# Patient Record
Sex: FEMALE | Race: BLACK OR AFRICAN AMERICAN | Employment: FULL TIME | URBAN - METROPOLITAN AREA
[De-identification: names, ages, dates, MRNs, and addresses within clinical notes are randomized per-mention and may not be internally consistent; named-entity substitution may affect disease eponyms.]

---

## 2017-11-24 ENCOUNTER — OP HISTORICAL/CONVERTED ENCOUNTER (OUTPATIENT)
Dept: OTHER | Age: 37
End: 2017-11-24

## 2018-12-21 LAB
ANTIBODY SCREEN, EXTERNAL: NEGATIVE
HBSAG, EXTERNAL: NEGATIVE
HIV, EXTERNAL: NEGATIVE
RPR, EXTERNAL: NEGATIVE
RUBELLA, EXTERNAL: NORMAL
TYPE, ABO & RH, EXTERNAL: NORMAL

## 2019-06-19 LAB — GRBS, EXTERNAL: POSITIVE

## 2019-06-27 ENCOUNTER — ANESTHESIA (OUTPATIENT)
Dept: LABOR AND DELIVERY | Age: 39
End: 2019-06-27
Payer: COMMERCIAL

## 2019-06-27 ENCOUNTER — HOSPITAL ENCOUNTER (INPATIENT)
Age: 39
LOS: 2 days | Discharge: HOME | End: 2019-06-29
Attending: OBSTETRICS & GYNECOLOGY | Admitting: OBSTETRICS & GYNECOLOGY
Payer: COMMERCIAL

## 2019-06-27 ENCOUNTER — ANESTHESIA EVENT (OUTPATIENT)
Dept: LABOR AND DELIVERY | Age: 39
End: 2019-06-27
Payer: COMMERCIAL

## 2019-06-27 PROBLEM — Z34.90 PREGNANCY: Status: ACTIVE | Noted: 2019-06-27

## 2019-06-27 LAB
ABO + RH BLD: NORMAL
BLOOD GROUP ANTIBODIES SERPL: NORMAL
ERYTHROCYTE [DISTWIDTH] IN BLOOD BY AUTOMATED COUNT: 13.8 % (ref 11.5–14.5)
HCT VFR BLD AUTO: 38.4 % (ref 35–47)
HGB BLD-MCNC: 13 G/DL (ref 11.5–16)
MCH RBC QN AUTO: 30.2 PG (ref 26–34)
MCHC RBC AUTO-ENTMCNC: 33.9 G/DL (ref 30–36.5)
MCV RBC AUTO: 89.3 FL (ref 80–99)
NRBC # BLD: 0 K/UL (ref 0–0.01)
NRBC BLD-RTO: 0 PER 100 WBC
PLATELET # BLD AUTO: 212 K/UL (ref 150–400)
PMV BLD AUTO: 11.2 FL (ref 8.9–12.9)
RBC # BLD AUTO: 4.3 M/UL (ref 3.8–5.2)
SPECIMEN EXP DATE BLD: NORMAL
TYPE, ABO & RH, EXTERNAL: NORMAL
WBC # BLD AUTO: 6.4 K/UL (ref 3.6–11)

## 2019-06-27 PROCEDURE — 77030036554

## 2019-06-27 PROCEDURE — 76210000084 HC EXTENDED RECOVERY PER HOUR

## 2019-06-27 PROCEDURE — 77030032490 HC SLV COMPR SCD KNE COVD -B

## 2019-06-27 PROCEDURE — 85027 COMPLETE CBC AUTOMATED: CPT

## 2019-06-27 PROCEDURE — 76010000392 HC C SECN EA ADDL 0.5 HR: Performed by: OBSTETRICS & GYNECOLOGY

## 2019-06-27 PROCEDURE — 74011250636 HC RX REV CODE- 250/636: Performed by: OBSTETRICS & GYNECOLOGY

## 2019-06-27 PROCEDURE — 74011250636 HC RX REV CODE- 250/636

## 2019-06-27 PROCEDURE — 74011000258 HC RX REV CODE- 258

## 2019-06-27 PROCEDURE — 74011000250 HC RX REV CODE- 250

## 2019-06-27 PROCEDURE — 77010026065 HC OXYGEN MINIMUM MEDICAL AIR: Performed by: OBSTETRICS & GYNECOLOGY

## 2019-06-27 PROCEDURE — 76010000391 HC C SECN FIRST 1 HR: Performed by: OBSTETRICS & GYNECOLOGY

## 2019-06-27 PROCEDURE — 74011250636 HC RX REV CODE- 250/636: Performed by: ANESTHESIOLOGY

## 2019-06-27 PROCEDURE — 86900 BLOOD TYPING SEROLOGIC ABO: CPT

## 2019-06-27 PROCEDURE — 76060000078 HC EPIDURAL ANESTHESIA: Performed by: OBSTETRICS & GYNECOLOGY

## 2019-06-27 PROCEDURE — 36415 COLL VENOUS BLD VENIPUNCTURE: CPT

## 2019-06-27 PROCEDURE — 77030034850

## 2019-06-27 PROCEDURE — 74011250637 HC RX REV CODE- 250/637: Performed by: OBSTETRICS & GYNECOLOGY

## 2019-06-27 PROCEDURE — 77030007866 HC KT SPN ANES BBMI -B: Performed by: ANESTHESIOLOGY

## 2019-06-27 PROCEDURE — 65270000029 HC RM PRIVATE

## 2019-06-27 RX ORDER — TRANEXAMIC ACID 100 MG/ML
1 INJECTION, SOLUTION INTRAVENOUS ONCE
Status: ACTIVE | OUTPATIENT
Start: 2019-06-27 | End: 2019-06-27

## 2019-06-27 RX ORDER — KETOROLAC TROMETHAMINE 30 MG/ML
30 INJECTION, SOLUTION INTRAMUSCULAR; INTRAVENOUS
Status: DISPENSED | OUTPATIENT
Start: 2019-06-27 | End: 2019-06-28

## 2019-06-27 RX ORDER — BUPIVACAINE HYDROCHLORIDE 7.5 MG/ML
INJECTION, SOLUTION EPIDURAL; RETROBULBAR AS NEEDED
Status: DISCONTINUED | OUTPATIENT
Start: 2019-06-27 | End: 2019-06-27 | Stop reason: HOSPADM

## 2019-06-27 RX ORDER — SODIUM CHLORIDE, SODIUM LACTATE, POTASSIUM CHLORIDE, CALCIUM CHLORIDE 600; 310; 30; 20 MG/100ML; MG/100ML; MG/100ML; MG/100ML
1000 INJECTION, SOLUTION INTRAVENOUS CONTINUOUS
Status: DISCONTINUED | OUTPATIENT
Start: 2019-06-27 | End: 2019-06-28 | Stop reason: HOSPADM

## 2019-06-27 RX ORDER — OXYCODONE HYDROCHLORIDE 5 MG/1
5 TABLET ORAL
Status: ACTIVE | OUTPATIENT
Start: 2019-06-27 | End: 2019-06-28

## 2019-06-27 RX ORDER — MORPHINE SULFATE 0.5 MG/ML
INJECTION, SOLUTION EPIDURAL; INTRATHECAL; INTRAVENOUS AS NEEDED
Status: DISCONTINUED | OUTPATIENT
Start: 2019-06-27 | End: 2019-06-27 | Stop reason: HOSPADM

## 2019-06-27 RX ORDER — OXYTOCIN/RINGER'S LACTATE 20/1000 ML
125-500 PLASTIC BAG, INJECTION (ML) INTRAVENOUS ONCE
Status: ACTIVE | OUTPATIENT
Start: 2019-06-27 | End: 2019-06-27

## 2019-06-27 RX ORDER — DIPHENHYDRAMINE HYDROCHLORIDE 50 MG/ML
12.5 INJECTION, SOLUTION INTRAMUSCULAR; INTRAVENOUS
Status: ACTIVE | OUTPATIENT
Start: 2019-06-27 | End: 2019-06-28

## 2019-06-27 RX ORDER — FENTANYL CITRATE 50 UG/ML
INJECTION, SOLUTION INTRAMUSCULAR; INTRAVENOUS AS NEEDED
Status: DISCONTINUED | OUTPATIENT
Start: 2019-06-27 | End: 2019-06-27 | Stop reason: HOSPADM

## 2019-06-27 RX ORDER — IBUPROFEN 800 MG/1
800 TABLET ORAL EVERY 8 HOURS
Status: DISCONTINUED | OUTPATIENT
Start: 2019-06-27 | End: 2019-06-29 | Stop reason: HOSPADM

## 2019-06-27 RX ORDER — NALOXONE HYDROCHLORIDE 0.4 MG/ML
0.4 INJECTION, SOLUTION INTRAMUSCULAR; INTRAVENOUS; SUBCUTANEOUS AS NEEDED
Status: DISCONTINUED | OUTPATIENT
Start: 2019-06-27 | End: 2019-06-29 | Stop reason: HOSPADM

## 2019-06-27 RX ORDER — ACETAMINOPHEN 325 MG/1
650 TABLET ORAL
Status: DISCONTINUED | OUTPATIENT
Start: 2019-06-27 | End: 2019-06-29 | Stop reason: HOSPADM

## 2019-06-27 RX ORDER — SODIUM CHLORIDE 0.9 % (FLUSH) 0.9 %
5-40 SYRINGE (ML) INJECTION EVERY 8 HOURS
Status: DISCONTINUED | OUTPATIENT
Start: 2019-06-27 | End: 2019-06-28 | Stop reason: HOSPADM

## 2019-06-27 RX ORDER — HYDROCODONE BITARTRATE AND ACETAMINOPHEN 5; 325 MG/1; MG/1
1 TABLET ORAL
Status: DISCONTINUED | OUTPATIENT
Start: 2019-06-27 | End: 2019-06-29 | Stop reason: HOSPADM

## 2019-06-27 RX ORDER — SODIUM CHLORIDE, SODIUM LACTATE, POTASSIUM CHLORIDE, CALCIUM CHLORIDE 600; 310; 30; 20 MG/100ML; MG/100ML; MG/100ML; MG/100ML
125 INJECTION, SOLUTION INTRAVENOUS CONTINUOUS
Status: DISPENSED | OUTPATIENT
Start: 2019-06-27 | End: 2019-06-28

## 2019-06-27 RX ORDER — DIPHENHYDRAMINE HCL 25 MG
25 CAPSULE ORAL
Status: DISCONTINUED | OUTPATIENT
Start: 2019-06-27 | End: 2019-06-29 | Stop reason: HOSPADM

## 2019-06-27 RX ORDER — OXYCODONE HYDROCHLORIDE 5 MG/1
10 TABLET ORAL
Status: ACTIVE | OUTPATIENT
Start: 2019-06-27 | End: 2019-06-28

## 2019-06-27 RX ORDER — SODIUM CHLORIDE 0.9 % (FLUSH) 0.9 %
5-40 SYRINGE (ML) INJECTION AS NEEDED
Status: DISCONTINUED | OUTPATIENT
Start: 2019-06-27 | End: 2019-06-29 | Stop reason: HOSPADM

## 2019-06-27 RX ORDER — CEFAZOLIN SODIUM/WATER 2 G/20 ML
2 SYRINGE (ML) INTRAVENOUS ONCE
Status: COMPLETED | OUTPATIENT
Start: 2019-06-27 | End: 2019-06-27

## 2019-06-27 RX ORDER — DOCUSATE SODIUM 100 MG/1
100 CAPSULE, LIQUID FILLED ORAL 2 TIMES DAILY
Status: DISCONTINUED | OUTPATIENT
Start: 2019-06-27 | End: 2019-06-29 | Stop reason: HOSPADM

## 2019-06-27 RX ORDER — SODIUM CHLORIDE 0.9 % (FLUSH) 0.9 %
5-40 SYRINGE (ML) INJECTION AS NEEDED
Status: DISCONTINUED | OUTPATIENT
Start: 2019-06-27 | End: 2019-06-28 | Stop reason: HOSPADM

## 2019-06-27 RX ORDER — SODIUM CHLORIDE 0.9 % (FLUSH) 0.9 %
5-40 SYRINGE (ML) INJECTION EVERY 8 HOURS
Status: DISCONTINUED | OUTPATIENT
Start: 2019-06-27 | End: 2019-06-29 | Stop reason: HOSPADM

## 2019-06-27 RX ORDER — SIMETHICONE 80 MG
80 TABLET,CHEWABLE ORAL AS NEEDED
Status: DISCONTINUED | OUTPATIENT
Start: 2019-06-27 | End: 2019-06-29 | Stop reason: HOSPADM

## 2019-06-27 RX ORDER — SODIUM CHLORIDE, SODIUM LACTATE, POTASSIUM CHLORIDE, CALCIUM CHLORIDE 600; 310; 30; 20 MG/100ML; MG/100ML; MG/100ML; MG/100ML
INJECTION, SOLUTION INTRAVENOUS
Status: DISCONTINUED | OUTPATIENT
Start: 2019-06-27 | End: 2019-06-27 | Stop reason: HOSPADM

## 2019-06-27 RX ORDER — CEFAZOLIN SODIUM/WATER 2 G/20 ML
SYRINGE (ML) INTRAVENOUS
Status: DISCONTINUED
Start: 2019-06-27 | End: 2019-06-27 | Stop reason: WASHOUT

## 2019-06-27 RX ORDER — HYDROMORPHONE HYDROCHLORIDE 2 MG/ML
0.5 INJECTION, SOLUTION INTRAMUSCULAR; INTRAVENOUS; SUBCUTANEOUS
Status: ACTIVE | OUTPATIENT
Start: 2019-06-27 | End: 2019-06-28

## 2019-06-27 RX ORDER — OXYTOCIN 10 [USP'U]/ML
INJECTION, SOLUTION INTRAMUSCULAR; INTRAVENOUS AS NEEDED
Status: DISCONTINUED | OUTPATIENT
Start: 2019-06-27 | End: 2019-06-27 | Stop reason: HOSPADM

## 2019-06-27 RX ORDER — ZOLPIDEM TARTRATE 5 MG/1
5 TABLET ORAL
Status: DISCONTINUED | OUTPATIENT
Start: 2019-06-28 | End: 2019-06-29 | Stop reason: HOSPADM

## 2019-06-27 RX ADMIN — FENTANYL CITRATE 25 MCG: 50 INJECTION, SOLUTION INTRAMUSCULAR; INTRAVENOUS at 08:20

## 2019-06-27 RX ADMIN — KETOROLAC TROMETHAMINE 30 MG: 30 INJECTION, SOLUTION INTRAMUSCULAR at 21:53

## 2019-06-27 RX ADMIN — SODIUM CHLORIDE, SODIUM LACTATE, POTASSIUM CHLORIDE, AND CALCIUM CHLORIDE 1000 ML: 600; 310; 30; 20 INJECTION, SOLUTION INTRAVENOUS at 05:50

## 2019-06-27 RX ADMIN — OXYTOCIN 30 UNITS: 10 INJECTION, SOLUTION INTRAMUSCULAR; INTRAVENOUS at 08:19

## 2019-06-27 RX ADMIN — Medication 2 G: at 07:40

## 2019-06-27 RX ADMIN — FENTANYL CITRATE 40 MCG: 50 INJECTION, SOLUTION INTRAMUSCULAR; INTRAVENOUS at 08:35

## 2019-06-27 RX ADMIN — BUPIVACAINE HYDROCHLORIDE 1.8 ML: 7.5 INJECTION, SOLUTION EPIDURAL; RETROBULBAR at 08:00

## 2019-06-27 RX ADMIN — SODIUM CHLORIDE, SODIUM LACTATE, POTASSIUM CHLORIDE, CALCIUM CHLORIDE: 600; 310; 30; 20 INJECTION, SOLUTION INTRAVENOUS at 07:47

## 2019-06-27 RX ADMIN — DOCUSATE SODIUM 100 MG: 100 CAPSULE, LIQUID FILLED ORAL at 18:28

## 2019-06-27 RX ADMIN — SODIUM CHLORIDE, SODIUM LACTATE, POTASSIUM CHLORIDE, AND CALCIUM CHLORIDE 1000 ML: 600; 310; 30; 20 INJECTION, SOLUTION INTRAVENOUS at 06:44

## 2019-06-27 RX ADMIN — FENTANYL CITRATE 10 MCG: 50 INJECTION, SOLUTION INTRAMUSCULAR; INTRAVENOUS at 08:00

## 2019-06-27 RX ADMIN — FENTANYL CITRATE 25 MCG: 50 INJECTION, SOLUTION INTRAMUSCULAR; INTRAVENOUS at 08:28

## 2019-06-27 RX ADMIN — MORPHINE SULFATE 200 MCG: 0.5 INJECTION, SOLUTION EPIDURAL; INTRATHECAL; INTRAVENOUS at 08:00

## 2019-06-27 RX ADMIN — KETOROLAC TROMETHAMINE 30 MG: 30 INJECTION, SOLUTION INTRAMUSCULAR at 13:54

## 2019-06-27 NOTE — PROGRESS NOTES
07-Bedside report received from Jay Ochoa RN. POC discussed with pt at this time, pt verbalized understanding and has no questions or concerns at this time. 07-Leroy Pickering at bedside to see pt and discuss POC.  07-Dr. Cammie Yoder at bedside to discuss POC and  section with pt at this time. 0743-Pt to OR in stable condition. 08-Dr. Cammie Yoder reported that pt's blood type from blood bank and blood type form record are different. Pt states at this time she is O+ which is blood type confirmed by lab. MD states must be a transcription issue and to call and get [facility] records sent from office after c/s. Lab called and this time to verify two pink tops were sent today for blood type confirmation and lab reports yes. 0850-QBL at delivery 700  1050-QBL 10 2hrs postpartum  0856-Infant placed with mother at this time wrapped in blankets due to mothers low temperature. 0905-Writer called lab for second time to confirm two pink tops were sent to verify blood type, lab states yes and that blood type is O+. 0911-Office called to request [facility] records, office states they will fax at this time. 0918-Infant to nursery at this time to be evaluated further. 0923-Fax received from office, [facility] records show A+. Dr. Cammie Yoder called, message left. 1048-Dr. Cammie Yoder called for update, MD reported that after receiving [facility] records it too states A+ which is incorrect. MD also reported that writer revalidated with hospital lab that pt's blood type is O+ which was confirmed with two pink top samples. No new orders at this time. 162-Writer placed abdominal binder on pt at this time. 162-Pt to NICU to see infant via wheelchair. -Pt assisted back to room via wheelchair. -Pt given menu to order dinner. -Silva catheter removed at this time, 535cc of urine noted when discontinued. -Bedside report given to SUSI Simon RN.

## 2019-06-27 NOTE — L&D DELIVERY NOTE
Operative Note    Name: Rainer Barber   Medical Record Number: [id number]   YOB: 1980  Today's Date: 2019      Pre-operative Diagnosis: IUP 36wks 6 days, prior extensive myomectomy, AMA    Post-operative Diagnosis: same, delivered    Procedure: primary LTCS    Surgeon(s):  Sarah Martini MD    Assist:  Austin Ramirez    Anesthesia: Spinal     QBL 700cc    Prophylactic Antibiotics: Ancef  DVT Prophylaxis: Sequential Compression Devices         Fetal Description: rudolph     Birth Information:   Information for the patient's :  Deidra Shone, Male Mikle Duverney [[id number]]   Delivery of a 2.39 kg male infant on 2019 at 8:17 AM. Apgars were 9  and 9 . Umbilical Cord: 3 Vessels     Umbilical Cord Events: None     Placenta: Spontaneous removal with Normal appearance. Amniotic Fluid Volume: Moderate     Amniotic Fluid Description:  Clear        Umbilical Cord: 3 vessels present    Placenta:  manual removal    Additional intraoperative findings: viable male vertex, apgars 9 and 9. 5lb 4 oz. Enlarged fibroid uterus.  Adnexa not evaluated    Specimens: none           Complications: none    Implants: none    Stable to PACU    Nalini Tamayo MD  2019  9:01 AM

## 2019-06-27 NOTE — DISCHARGE INSTRUCTIONS
Discharge Instructions for  Section    Patient ID:  Luzmaria Andrews  [id number]  37 y.o.  1980    Take Home Medications       Continue taking your prenatal vitamins if you are breastfeeding. Follow-up care is a key part of your treatment and safety. Be sure to keep all your scheduled appointments    Activity  1. Avoid heavy lifting greater than 10lbs for 2 weeks after your surgery  2. Pelvic rest for 6 weeks, ie, nothing in your vagina for 6 weeks  (no intercourse, tampons, or douching). No tubs for 6 weeks. 3. No driving for 2 weeks and until you are no longer taking prescription pain medications and you can put your foot on the break in a hurry   4. Limit climbing stairs to only when necessary the first 1-2 weeks. Hold the railing and do not carry your baby up and downstairs at first for safety   5. You may walk as tolerated, though be care to not over-do it. Walking will assist in overall healing, decrease constipation and bloating. Euell Reddish feel better more quickly with some daily movement. Diet  Regular diet as tolerated    Wound care  There are several types of incision closures. 1. If you have metal staples in place when you leave the hospital, please call your doctors office to schedule the staple removal as directed at discharge. 2. If you have steri strips covering your incision, you may remove them as they fall off or be sure to remove them about one week after your surgery. Removing them in the shower may be easier. 3. If you have Dermabond, or skin glue, covering your incision, it will fall off slowly in the next few weeks. You may remove it as it begins to peel off. Additional wound care  1. Clear or reddish drainage from your incision is normal.  It's best to leave it open to the air, but if there is drainage, you may cover the incision lightly with gauze, preferably without tape. 2.  Keep the incision clean and dry.     3. Numbness of the skin at or around your incision is normal and the feeling usually returns gradually. 4. Call your doctor if you have increasing drainage from your incision, an unpleasant odor, red streaks, an increase in pain, or if it appears to open. Pain Management  1. Continue prescription pain medication as written by discharging physician  2. Over the counter medications such as Tylenol and ibuprofen (Motrin or Advil) are also ideal.  These may be taken together or in alternating doses. You may  take the maximum dose:  Motrin or Advil (generic ibuprofen), either 3 tablets every 6 hours or 4 tablets every 8 hours. Your prescription medication conntains a narcotic mixed with Tylenol,so  you should not take any extra Tylenol or acetaminophen until you have reduced your prescription pain medication. The maximum dose is Tylenol or acetominophen 1000 mg every 6 hours (equivalent to 2 Extra Strength Tylenols every 6 hours). 3. After a few days, begin to replace the prescription medication with over the counter medications. Use the prescription medication if needed for more severe pain or at night. The prescription medication can be addictive if overused. 4. Add heating pad or sitz baths as needed. Constipation  1. Constipation is normal after surgery, especially while taking prescription narcotic pain medication. 2. Over the counter remedies including ducosate (Colace), take 1-2 capsules 1-2 times daily for soft stool as needed. You may also add/ try milk of magnesia or rectal remedies such as Dulcolax or Fleets enema. Recovery: What to Expect at Home  1. Fatigue is expected. Try to rest when you can and don't worry about doing housework or other tasks which can wait. 2. The soreness in your incision will improve significantly over the first 2 weeks, but it may take 6-8 weeks before you are completely recovered.   3. Back pain or general body aches or muscle soreness are expected and should improve with acetominophen or ibuprofen. 4. Leg swelling due to pregnancy and/or IV fluids given in the hospital will take about two weeks to resolve. 5. Most women experience some form of the \"Baby Blues\" after having a baby. Feeling emotional, tearful, frustrated, anxious, sad, and irritable some of the time is normal and go away after about 2 weeks. Adequate rest and help from your family will help. Take breaks from caring for the baby. Call your doctor if your symptoms seem severe, last more than 2 weeks, or seem to be getting worse instead of better. Get help immediately if you have thoughts of wanting to hurt yourself or others! Call your doctor or seek immediate medical care if you have:  Heavy vaginal bleeding, soaking through one or more pads an hour for several hours. Foul-smelling discharge from your vagina or incision. Consistent nausea and vomiting and cannot keep fluids down. Consistent pain that does not get better after you take pain medicine.   Sudden chest pain and shortness of breath  Signs of a blood clot: pain/ swelling/ increasing redness in your lower extremeties  Signs of infection: increased pain in your abdomen or vaginal area; red streaks, warmth, or tenderness of your breasts; fever of 100.5 F or greater

## 2019-06-27 NOTE — ANESTHESIA PROCEDURE NOTES
Spinal Block    Start time: 6/27/2019 7:52 AM  End time: 6/27/2019 8:00 AM  Performed by: Karrie Wood MD  Authorized by: Karrie Wood MD     Pre-procedure: Indications: primary anesthetic  Preanesthetic Checklist: patient identified, risks and benefits discussed, anesthesia consent, patient being monitored and timeout performed    Timeout Time: 07:52          Spinal Block:   Patient Position:  Seated  Prep Region:  Lumbar  Prep: chlorhexidine      Location:  L3-4  Technique:  Single shot    Local Dose (mL):  5    Needle:   Needle Type:   Aishwarya    Attempts:  2      Events: CSF confirmed, no blood with aspiration, no paresthesia and injection painful        Assessment:  Insertion:  Uncomplicated

## 2019-06-27 NOTE — ANESTHESIA POSTPROCEDURE EVALUATION
Procedure(s):   SECTION. spinal    Anesthesia Post Evaluation      Multimodal analgesia: multimodal analgesia not used between 6 hours prior to anesthesia start to PACU discharge  Patient location during evaluation: PACU  Patient participation: complete - patient participated  Level of consciousness: awake  Pain management: adequate  Airway patency: patent  Anesthetic complications: no  Cardiovascular status: acceptable, blood pressure returned to baseline and hemodynamically stable  Respiratory status: acceptable  Hydration status: acceptable  Post anesthesia nausea and vomiting:  controlled    /74  Temp 97.5F  O2 Sat 99%  RR 20  No vitals data found for the desired time range.

## 2019-06-27 NOTE — H&P
History & Physical    Name: Eduardo Pandey MRN: [id number]  SSN: [id number]    YOB: 1980  Age: 37 y.o. Sex: female      Subjective:     Estimated Date of Delivery: 19  OB History        5    Para        Term                AB   4    Living           SAB   4    TAB        Ectopic        Molar        Multiple        Live Births   0                Ms. Womack Must admitted with pregnancy at 36w6d for  section due to prior extensive [address] myomectomy in 2018. Prenatal course was complicated by AMA, HSV on valtrex and prior myomectomy. Please see prenatal records for details. Past Medical History:   Diagnosis Date    Fibroids     Genital herpes     History of in vitro fertilization     Infertility, female     Recurrent pregnancy loss      Past Surgical History:   Procedure Laterality Date    HX MYOMECTOMY       Social History     Occupational History    Not on file   Tobacco Use    Smoking status: Never Smoker    Smokeless tobacco: Never Used   Substance and Sexual Activity    Alcohol use: Not Currently    Drug use: Never    Sexual activity: Not on file     History reviewed. No pertinent family history. No Known Allergies  Prior to Admission medications    Medication Sig Start Date End Date Taking? Authorizing Provider   PNV66-Iron Fumarate-FA-DSS-DHA 26-1.2- mg cap Take  by mouth. Yes Provider, Historical        Review of Systems: Pertinent items are noted in the History of Present Illness.     Objective:     Vitals:  Vitals:    19 0556 19 0558 19 0713   BP: 106/67  115/70   Pulse: 81  80   Resp: 16  16   Temp: 98.5 °F (36.9 °C)  98.4 °F (36.9 °C)   Weight:  79.8 kg (176 lb)    Height:  5' 4\" (1.626 m)         Physical Exam:  General: comfortable NAD  CVS: RRR no r/m/g  Pulm: CTAB  Abdm: gravid, NT  Back: spine NT, NELLY CVA NT  LE NT w/o significant edema  [place]: none  Membranes:  Intact  Fetal Heart Rate: Reactive  Baseline: 120 130  per minute  Variability: moderate  Accelerations: yes  Decelerations: none    Prenatal Labs:   Lab Results   Component Value Date/Time    Rubella, External Immune 12/21/2018    GrBStrep, External Positive 06/19/2019    HBsAg, External Negative 12/21/2018    HIV, External Negative 12/21/2018    RPR, External Negative 12/21/2018        Impression/Plan:     Plan:  IUP @36wks 6 days prior extensive myomectomy  1. Planned c/s today  2. PARQ held yesterday in the office and reviewed again today, risks/benefits reviewed, including, but not limited to, bleeding, infection, damage to internal organs, thrombosis. Indication/ expectations/ logistics reviewed, all ques answered. Consent obtained, questions answered, proceed to OR  3. SCDs and IV abx in OR  4.  She is not anemic    Signed By:  Alva Moon MD     June 27, 2019

## 2019-06-27 NOTE — ANESTHESIA PREPROCEDURE EVALUATION
Relevant Problems   No relevant active problems       Anesthetic History   No history of anesthetic complications            Review of Systems / Medical History  Patient summary reviewed and nursing notes reviewed    Pulmonary  Within defined limits                 Neuro/Psych   Within defined limits           Cardiovascular                  Exercise tolerance: >4 METS     GI/Hepatic/Renal  Within defined limits              Endo/Other  Within defined limits           Other Findings   Comments:   EDC = 2019  C/S for previous myomectomy           Physical Exam    Airway  Mallampati: III    Neck ROM: normal range of motion   Mouth opening: Normal     Cardiovascular    Rhythm: regular  Rate: normal         Dental  No notable dental hx       Pulmonary  Breath sounds clear to auscultation               Abdominal         Other Findings            Anesthetic Plan    ASA: 2  Anesthesia type: spinal            Anesthetic plan and risks discussed with: Patient and Spouse      Informed consent obtained.

## 2019-06-27 NOTE — PROGRESS NOTES
06/27/19 12:24 PM  CM met with JUAN RAMON to complete initial assessment and to begin discharge planning. Demographics were reviewed and confirmed. JUAN RAMON and her /FOB aDrlene Mcqueen ([phone]) live together in [place] and this is their first baby. 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 MOB noted that FOB is self employed and will have a flexible schedule. MOB noted that she has a supportive group of friends who will be her help postpartum. Patient has car seat, crib, clothing, and other necessary supplies. Denied need for [facility] and Medicaid services. JUAN RAMON plans to breast and bottlefeed and confirmed that she has a Medela pump to use at home. Dr. Nick Stuart at [address] [place] [address] will provide medical follow up for the baby. Care Management Interventions  PCP Verified by CM:  Yes  Mode of Transport at Discharge: [address] (CM Consult): Discharge Planning  Current Support Network: Lives with Spouse  Confirm Follow Up Transport: Family  Plan discussed with Pt/Family/Caregiver: Yes  Freedom of Choice Offered: Yes  Discharge Location  Discharge Placement: Home with outpatient services  DIMITRY Mascorro

## 2019-06-28 LAB
ERYTHROCYTE [DISTWIDTH] IN BLOOD BY AUTOMATED COUNT: 13.8 % (ref 11.5–14.5)
HCT VFR BLD AUTO: 36.2 % (ref 35–47)
HGB BLD-MCNC: 12.1 G/DL (ref 11.5–16)
MCH RBC QN AUTO: 29.7 PG (ref 26–34)
MCHC RBC AUTO-ENTMCNC: 33.4 G/DL (ref 30–36.5)
MCV RBC AUTO: 88.7 FL (ref 80–99)
NRBC # BLD: 0 K/UL (ref 0–0.01)
NRBC BLD-RTO: 0 PER 100 WBC
PLATELET # BLD AUTO: 193 K/UL (ref 150–400)
PMV BLD AUTO: 11.1 FL (ref 8.9–12.9)
RBC # BLD AUTO: 4.08 M/UL (ref 3.8–5.2)
WBC # BLD AUTO: 8.8 K/UL (ref 3.6–11)

## 2019-06-28 PROCEDURE — 65270000029 HC RM PRIVATE

## 2019-06-28 PROCEDURE — 74011250637 HC RX REV CODE- 250/637: Performed by: OBSTETRICS & GYNECOLOGY

## 2019-06-28 PROCEDURE — 36415 COLL VENOUS BLD VENIPUNCTURE: CPT

## 2019-06-28 PROCEDURE — 85027 COMPLETE CBC AUTOMATED: CPT

## 2019-06-28 PROCEDURE — 74011250636 HC RX REV CODE- 250/636: Performed by: ANESTHESIOLOGY

## 2019-06-28 RX ADMIN — IBUPROFEN 800 MG: 800 TABLET ORAL at 23:21

## 2019-06-28 RX ADMIN — SIMETHICONE CHEW TAB 80 MG 80 MG: 80 TABLET ORAL at 23:26

## 2019-06-28 RX ADMIN — IBUPROFEN 800 MG: 800 TABLET ORAL at 15:34

## 2019-06-28 RX ADMIN — KETOROLAC TROMETHAMINE 30 MG: 30 INJECTION, SOLUTION INTRAMUSCULAR at 08:57

## 2019-06-28 RX ADMIN — DOCUSATE SODIUM 100 MG: 100 CAPSULE, LIQUID FILLED ORAL at 09:56

## 2019-06-28 NOTE — PROGRESS NOTES
2130- Patient resting in bed. Returned from NICU. Breastpump set up and instructions given. 2310- Patient declines hourly rounding at this time.  No complaints

## 2019-06-28 NOTE — LACTATION NOTE
This note was copied from a baby's chart. Mom has been pumping whilw baby had been in NICU and states has no milk yet and is giving formula now. Discussed putting baby to breast if she desires at next feeding. Mo worried she doesn't have any milk yet. Discussed supply and demand, and infants stomach size. Reviewed breastfeeding basics:  Supply and demand,  stomach size, early  Feeding cues, skin to skin, positioning and baby led latch-on, assymetrical latch with signs of good, deep latch vs shallow, feeding frequency and duration, and log sheet for tracking infant feedings and output. Breastfeeding Booklet and Warm line information given. Discussed typical  weight loss and the importance of infant weight checks with pediatrician 1-2 post discharge. Hand Expression Education:  Mom taught how to manually hand express her colostrum. Emphasized the importance of providing infant with valuable colostrum as infant rests skin to skin at breast.  Aware to avoid extended periods of non-feeding. Aware to offer 10-20+ drops of colostrum every 2-3 hours until infant is latching and nursing effectively. Taught the rationale behind this low tech but highly effective evidence based practice. Pt will successfully establish breastfeeding by feeding in response to early feeding cues   or wake every 3h, will obtain deep latch, and will keep log of feedings/output. Taught to BF at hunger cues and or q 2-3 hrs and to offer 10-20 drops of hand expressed colostrum at any non-feeds. Breast Assessment  Left Breast: Medium, Large  Left Nipple: Everted, Intact  Right Breast: Medium  Right Nipple: Intact, Everted  Breast- Feeding Assessment  Attends Breast-Feeding Classes: No  Breast Trauma/Surgery: No  Lactation Consultant Visits  Breast-Feedings: Not breast-feeding(mom giving formula and plsna to breast feed.)     Discussed with mother her plan for feeding.   Reviewed the benefits of exclusive breast milk feeding during the hospital stay. Informed her of the risks of using formula to supplement in the first few days of life as well as the benefits of successful breast milk feeding; referred her to the Breastfeeding booklet about this information. She acknowledges understanding of information reviewed and states that it is her plan to both breast and formula feed her infant. Will support her choice and offer additional information as needed.

## 2019-06-28 NOTE — PROGRESS NOTES
0745-Bedside report received from SUSI Smith RN. Pt sleeping, did not arouse during report, Austin Begin will let pt sleep and come back once pt is up to perform assessment/ VS.  1204-TRANSFER - OUT REPORT:    Verbal report given to BRIGIDA Mahoney RN(name) on [employer]  being transferred to MIU(unit) for routine progression of care       Report consisted of patients Situation, Background, Assessment and   Recommendations(SBAR). Information from the following report(s) SBAR, Kardex, OR Summary, Procedure Summary, Intake/Output, MAR, Recent Results and Med Rec Status was reviewed with the receiving nurse. Lines:       Opportunity for questions and clarification was provided.       Patient transported with:   Registered Nurse

## 2019-06-28 NOTE — PROGRESS NOTES
PostPartum Note    Stacie Howe  [id number]  1980  37 y.o.    S:  Ms. Stacie Howe is a 37 y.o.  POD #1 s/p LTCS @ 36w6d. Doing well. She had a baby boy who is in the NICU. Her lochia is like a period. She describes her pain as mild and is well controlled with PO medications. Tolerating PO intake. O:   Visit Vitals  /60   Pulse 77   Temp 98.2 °F (36.8 °C)   Resp 16   Ht 5' 4\" (1.626 m)   Wt 79.8 kg (176 lb)   SpO2 99%   Breastfeeding? Unknown   BMI 30.21 kg/m²       Lab Results   Component Value Date/Time    WBC 8.8 2019 05:52 AM    HGB 12.1 2019 05:52 AM    HCT 36.2 2019 05:52 AM    PLATELET 655  05:52 AM    MCV 88.7 2019 05:52 AM       Gen - No acute distress  Abdomen - Fundus firm, below the umbilicus; incision c/d/i    Ext - Warm, well perfused. Nontender    A/P:  POD #1 s/p LTCS @ 36w6d doing well. 1.  Routine PP instructions/ care discussed  2. Blood type - Rh +  3. Rubella imm  4. Circumcision - deferred as baby in NICU    5. Discharge POD#3    6. F/U 4-6 weeks for PP check.       Zeeshan Moralez MD  [facility]

## 2019-06-28 NOTE — OP NOTES
Boaz Bhatti [facility] 77  OPERATIVE REPORT    Name:  Landon Benson  MR#:  [id number]  :  1980  ACCOUNT #:  [de-identified]  DATE OF SERVICE:  2019    PREOPERATIVE DIAGNOSES:  1. Intrauterine pregnancy at 36 weeks and 6 days. 2.  Prior extensive [place] myomectomy. 3.  Advanced maternal age. POSTOPERATIVE DIAGNOSES:  1. Intrauterine pregnancy at 36 weeks and 6 days. 2.  Prior extensive [place] myomectomy. 3.  Advanced maternal age. 4.  Delivered. PROCEDURE PERFORMED:  Primary low transverse  section. SURGEON:  Neema Mills MD    ASSISTANT:  Lin Spann. ANESTHESIA:  Spinal.    COMPLICATIONS:  None. SPECIMENS REMOVED:  None. IMPLANTS:  none    ESTIMATED BLOOD LOSS:  See QBL    QUANTITAVE BLOOD LOSS:  700 mL. IV FLUIDS:  1500 mL of crystalloids. URINE OUTPUT:  120 mL, clear. INTRAOPERATIVE FINDINGS:  A viable male infant in vertex presentation with Apgars of 9 and 9, weight 5 pounds 4 ounces. Adnexa were not evaluated. Uterus:  Enlarged fibroid uterus, repaired without being exteriorized. INDICATIONS:  The patient is a 75-year-old G5, P0-0-4-0, with an intrauterine pregnancy at 36 weeks and 6 days who presents to Labor and Delivery for  section due to her history of prior extensive myomectomy. PROCEDURE:  The patient was taken to the operating room where her spinal anesthesia was easily placed and found to be adequate. She was prepped and draped in normal sterile fashion in dorsal supine position with leftward tilt. A Pfannenstiel skin incision was made two fingerbreadths above the pubic symphysis and carried down to the underlying layer of fascia. The fascia was incised at the midline and the incision was extended bilaterally. The superior aspect of the fascial incision was grasped with Kocher clamps and elevated and the underlying rectus muscles were dissected off both bluntly and sharply.   The same was done to the inferior aspect of the fascial incision. The rectus muscle was  in the midline with a scalpel and the abdominal cavity was entered sharply with excellent visualization of underlying structures in the bowel, bladder and uterus. A bladder blade was placed. The bladder and vesicouterine peritoneum were well below the operative field. A low transverse incision was made on the uterus and clear fluid was noted at the amniotomy. A viable male infant was delivered from vertex presentation without difficulty. His cord was clamped and cut after a 30-second cord clamp delay. He was handed off to the waiting pediatric team.  The placenta was delivered with manual assistance and the uterus was cleared of clots and debris. It was unable to be exteriorized for repair. The hysterotomy was repaired with 0 Vicryl in a running locked fashion in two layers. At the completion, two additional figure-of-eight sutures were placed at the midline to ensure hemostasis. When this was completed, the hysterotomy was reinspected and noted to be hemostatic. The gutters were cleared of clots and debris. The peritoneum was then reapproximated with 2-0 Vicryl in a running fashion. The rectus muscles were reapproximated with 3-0 Vicryl in a block-stitch fashion. The inferior and superior fascial planes were inspected and noted to be hemostatic. The fascia was reapproximated with 0 Vicryl in a running fashion. The subcutaneous tissue was 1 cm thickened, hemostatic. The skin was closed with 4-0 Monocryl in a subcuticular fashion. Steri-Strips were placed. The patient tolerated the procedure well. All sponge, lap, and needle counts were correct x2 and she was taken to the recovery room in stable condition. She received Ancef 2 g IV prior to incision and TXA IV just after the incision. SCDs were placed upon entering the operating room.         Louvenia Olszewski, MD      [id number]/  D:  06/27/2019 11:03  T:  06/27/2019 21:56  JOB #:  [id number]

## 2019-06-29 VITALS
BODY MASS INDEX: 30.05 KG/M2 | TEMPERATURE: 98.1 F | SYSTOLIC BLOOD PRESSURE: 107 MMHG | DIASTOLIC BLOOD PRESSURE: 73 MMHG | WEIGHT: 176 LBS | HEART RATE: 78 BPM | OXYGEN SATURATION: 99 % | HEIGHT: 64 IN | RESPIRATION RATE: 16 BRPM

## 2019-06-29 PROCEDURE — 74011250637 HC RX REV CODE- 250/637: Performed by: OBSTETRICS & GYNECOLOGY

## 2019-06-29 RX ORDER — HYDROCODONE BITARTRATE AND ACETAMINOPHEN 5; 325 MG/1; MG/1
1 TABLET ORAL
Qty: 12 TAB | Refills: 0 | Status: SHIPPED | OUTPATIENT
Start: 2019-06-29 | End: 2019-07-04

## 2019-06-29 RX ADMIN — IBUPROFEN 800 MG: 800 TABLET ORAL at 13:15

## 2019-06-29 NOTE — LACTATION NOTE
This note was copied from a baby's chart. Went in for lactation visit with mother, mother states she has no milk so she is not putting baby to breast she is just pumping some and feeding baby formula. Mother states she would like to feed baby at the breast, discussed the importance of putting baby to breast to assist in milk coming in and the baby learning to breastfeed. Mother has pump for home use and does plan to offer formula as well as breast. Mother to call [facility] for next feeding as baby was just recently formula fed.

## 2019-06-29 NOTE — LACTATION NOTE
This note was copied from a baby's chart. Discussed with mother her plan for feeding. Reviewed the benefits of exclusive breast milk feeding during the hospital stay. Informed her of the risks of using formula to supplement in the first few days of life as well as the benefits of successful breast milk feeding; referred her to the Breastfeeding booklet about this information. She acknowledges understanding of information reviewed and states that it is her plan to both breast and formula feed her infant. Will support her choice and offer additional information as needed. Pt chooses to do both breast and bottle. Discussed effects of early supplementation on breastfeeding success; may decrease breastmilk production and supply, increase risk for pathological engorgement, baby may develop preference for faster flow from bottles vs breast, and baby's stomach can be stretched if larger volumes of formula are given. Mother has been pumping only and offering baby formula in bottle, states that baby won't latch to the breast, assisted mother with latching baby, baby very fussy at the breast won't latch, shield placed on mother, baby latched with shield with no problem. Pros and cons of nipple shield use reviewed. Patient instructed how to apply shield to nipple/areola and cleaning of nipple shield. Nipple shield plan of care includes breastfeeding with nipple shield per instructions. Reinforces with pt that nipple shield is best used as temporary tool/aid to help infant learn how to latch onto breast.  Recommend follow-up with OP lactation consultant after discharge from hospital.  Reviewed community resources for breastfeeding support. Guidelines for pumping, milk collection and storage, proper cleaning of pump parts all reviewed. Differences between hospital grade rental pumps vs store bought double electric/hand pumps discussed. Set up pumping with double electric set up. Assisted with pump session. List of area pump rental locations and lactation support services reviewed. Reviewed breastfeeding basics:  How milk is made and normal  breastfeeding behaviors discussed. Supply and demand,  stomach size, early feeding cues, skin to skin bonding with comfortable positioning and baby led latch-on reviewed. How to identify signs of successful breastfeeding sessions reviewed; education on assymetrical latch, signs of effective latching vs shallow, in-effective latching, normal  feeding frequency and duration and expected infant output discussed. Normal course of breastfeeding discussed including the AAP's recommendation that children receive exclusive breast milk feedings for the first six months of life with breast milk feedings to continue through the first year of life and/or beyond as complimentary table foods are added. Breastfeeding Booklet and Warm line information provided with discussion. Discussed typical  weight loss and the importance of pediatrician appointment within 24-48 hours of discharge, at 2 weeks of life and normalcy of requesting pediatric weight checks as needed in between visits. Pt will successfully establish breastfeeding by feeding in response to early feeding cues   or wake every 3h, will obtain deep latch, and will keep log of feedings/output. Taught to BF at hunger cues and or q 2-3 hrs.     Breast Assessment  Left Breast: Medium, Large  Left Nipple: Everted, Intact  Right Breast: Medium, Large  Right Nipple: Everted, Intact  Breast- Feeding Assessment  Attends Breast-Feeding Classes: No  Breast-Feeding Experience: No  Breast Trauma/Surgery: No  Type/Quality: Fair(with shield)  Lactation Consultant Visits  Breast-Feedings: Attempted breast-feeding  Mother/Infant Observation  Mother Observation: Breast comfortable, Close hold  Infant Observation: Latches nipple and aereolae, Lips flanged, lower, Lips flanged, upper, Opens mouth  LATCH Documentation  Latch: Grasps breast, tongue down, lips flanged, rhythmic sucking(with shield)  Audible Swallowing: A few with stimulation  Type of Nipple: Everted (after stimulation)  Comfort (Breast/Nipple): Soft/non-tender  Hold (Positioning): Full assist, teach one side, mother does other, staff holds  [facility] Score: 8

## 2019-06-29 NOTE — PROGRESS NOTES
PostPartum Note    Darrel Pettit  [id number]  1980  37 y.o.    S:  Ms. Darrel Pettit is a 37 y.o.  POD #2 s/p LTCS @ 36w6d. Doing well. She had a baby boy. Her lochia is like a period. She describes her pain as mild and is well controlled with PO medications. She is pumping and this is going well. She is ambulating and voiding. Tolerating PO intake. O:   Visit Vitals  /73 (BP 1 Location: Left arm, BP Patient Position: At rest)   Pulse 78   Temp 98.1 °F (36.7 °C)   Resp 16   Ht 5' 4\" (1.626 m)   Wt 79.8 kg (176 lb)   SpO2 99%   Breastfeeding? Unknown   BMI 30.21 kg/m²       Lab Results   Component Value Date/Time    WBC 8.8 2019 05:52 AM    HGB 12.1 2019 05:52 AM    HCT 36.2 2019 05:52 AM    PLATELET 518  05:52 AM    MCV 88.7 2019 05:52 AM       Gen - No acute distress  Abdomen - Fundus firm, below the umbilicus   Ext - Warm, well perfused. Nontender    A/P:  POD #2 s/p LTCS @ 36w6d doing well. 1.  Routine PP instructions/ care discussed  2. Blood type - Rh pos  3. Rubella imm  4. Circumcision desired and consented   5. Discharge home today   6. F/U 4-6 weeks for PP check.       Lamont Roque MD  [facility]

## 2024-06-03 NOTE — DISCHARGE SUMMARY
Patient ID:  Zaynab Malcolm  [id number]  89 y.o.  1980    Admit Date: 2019    Discharge Date:     Admitting Physician: Saud Fregoso MD    Attending Physician: Latrell Moreno MD    Admission Diagnoses: Pregnancy [Z34.90]    Procedures for this admission: Procedure(s):   SECTION    Discharge Diagnoses: Same as above with primary LTCS producing a viable infant. Information for the patient's :  Christi Carterarld Jurist [[id number]]       boy apgars 5 and 5, 5lb 4oz     Discharge Disposition:  good    Discharge Condition:  good    Additional Diagnoses: IUP 36wks 6 days, prior extensive myomectomy. AMA. Maternal Labs:   Lab Results   Component Value Date/Time    HBsAg, External Negative 2018    HIV, External Negative 2018    Rubella, External Immune 2018    RPR, External Negative 2018    GrBStrep, External Positive 2019       Cord Blood Results:   Information for the patient's :  Christi Carter Gwendolin [[id number]]   No results found for: PCTABR, PCTDIG, BILI, ABORH          History of Present Illness:   OB History        5    Para        Term                AB   4    Living           SAB   4    TAB        Ectopic        Molar        Multiple        Live Births   0              Admitted for  Section. Hospital Course:   Patient was admitted as above and delivered via primary LTCS . Please the chart for details. The postpartum course was unremarkable. She was deemed stable for discharge home on day 2.     Follow-up Care: 4-6wks        Signed:  Saud Fregoso MD  2019  8:58 AM independent

## (undated) DEVICE — STERILE POLYISOPRENE POWDER-FREE SURGICAL GLOVES: Brand: PROTEXIS

## (undated) DEVICE — 3000CC GUARDIAN II: Brand: GUARDIAN

## (undated) DEVICE — HANDLE LT SNAP ON ULT DURABLE LENS FOR TRUMPF ALC DISPOSABLE

## (undated) DEVICE — STERILE POLYISOPRENE POWDER-FREE SURGICAL GLOVES WITH EMOLLIENT COATING: Brand: PROTEXIS

## (undated) DEVICE — SUTURE VCRL SZ 0 L36IN ABSRB VLT L40MM CT 1/2 CIR J358H

## (undated) DEVICE — REM POLYHESIVE ADULT PATIENT RETURN ELECTRODE: Brand: VALLEYLAB

## (undated) DEVICE — ROCKER SWITCH PENCIL HOLSTER: Brand: VALLEYLAB

## (undated) DEVICE — SUTURE MCRYL SZ 4 0 L18IN ABSRB VLT PS 1 L24MM 3 8 CIR REV Y682H

## (undated) DEVICE — TRAY CATH OD16FR SIL URIN M STATLOK STBL DEV SURSTP

## (undated) DEVICE — LARGE, DISPOSABLE ALEXIS O C-SECTION PROTECTOR - RETRACTOR: Brand: ALEXIS ® O C-SECTION PROTECTOR - RETRACTOR

## (undated) DEVICE — BLADE SURG UNIV BLUE --

## (undated) DEVICE — SLEEVE COMPR STD 12 IN FOR 165IN CALF COMFORT VENODYNE SYS

## (undated) DEVICE — BULB SYRINGE, IRRIGATION WITH PROTECTIVE CAP, 60 CC, INDIVIDUALLY WRAPPED: Brand: DOVER

## (undated) DEVICE — TIP CLEANER: Brand: VALLEYLAB

## (undated) DEVICE — SUTURE VCRL 3-0 L36IN ABSRB UD CT L40MM 1/2 CIR TAPERPOINT J956H

## (undated) DEVICE — (D)PREP SKN CHLRAPRP APPL 26ML -- CONVERT TO ITEM 371833

## (undated) DEVICE — WATERPROOF, BACTERIA PROOF DRESSING WITH ABSORBENT SEE THROUGH PAD: Brand: OPSITE POST-OP VISIBLE 25X10CM CTN 20

## (undated) DEVICE — STRIP,CLOSURE,WOUND,MEDI-STRIP,1/2X4: Brand: MEDLINE

## (undated) DEVICE — C-SECTION II-LF: Brand: MEDLINE INDUSTRIES, INC.

## (undated) DEVICE — TOWEL,OR,DSP,ST,BLUE,STD,2/PK,40PK/CS: Brand: MEDLINE

## (undated) DEVICE — SOLUTION IV 1000ML 0.9% SOD CHL

## (undated) DEVICE — SOLIDIFIER FLUID 3000 CC ABSORB

## (undated) DEVICE — SUTURE VCRL SZ 2-0 L36IN ABSRB UD L36MM CT-1 1/2 CIR J945H